# Patient Record
(demographics unavailable — no encounter records)

---

## 2025-05-29 NOTE — PHYSICAL EXAM
[Alert] : alert [Acute Distress] : no acute distress [Normocephalic] : normocephalic [Flat Open Anterior [place]] : flat open anterior fontanelle [Icteric sclera] : nonicteric sclera [PERRL] : PERRL [Red Reflex Bilateral] : red reflex bilateral [Normally Placed Ears] : normally placed ears [Auricles Well Formed] : auricles well formed [Clear Tympanic membranes] : clear tympanic membranes [Light reflex present] : light reflex present [Bony structures visible] : bony structures visible [Patent Auditory Canal] : patent auditory canal [Discharge] : no discharge [Nares Patent] : nares patent [Palate Intact] : palate intact [Uvula Midline] : uvula midline [Supple, full passive range of motion] : supple, full passive range of motion [Palpable Masses] : no palpable masses [Symmetric Chest Rise] : symmetric chest rise [Clear to Auscultation Bilaterally] : clear to auscultation bilaterally [Regular Rate and Rhythm] : regular rate and rhythm [S1, S2 present] : S1, S2 present [Murmurs] : no murmurs [+2 Femoral Pulses] : +2 femoral pulses [Soft] : soft [Tender] : nontender [Distended] : not distended [Bowel Sounds] : bowel sounds present [Umbilical Stump Dry, Clean, Intact] : umbilical stump dry, clean, intact [Hepatomegaly] : no hepatomegaly [Splenomegaly] : no splenomegaly [Normal external genitailia] : normal external genitalia [Central Urethral Opening] : central urethral opening [Testicles Descended Bilaterally] : testicles descended bilaterally [Patent] : patent [Normally Placed] : normally placed [No Abnormal Lymph Nodes Palpated] : no abnormal lymph nodes palpated [Chairez-Ortolani] : negative Chairez-Ortolani [Symmetric Flexed Extremities] : symmetric flexed extremities [Spinal Dimple] : no spinal dimple [Tuft of Hair] : no tuft of hair [Startle Reflex] : startle reflex present [Suck Reflex] : suck reflex present [Rooting] : rooting reflex present [Palmar Grasp] : palmar grasp present [Plantar Grasp] : plantar reflex present [Symmetric Shashi] : symmetric [place] [Jaundice] : not jaundice

## 2025-05-29 NOTE — HISTORY OF PRESENT ILLNESS
[FreeTextEntry1] : 37+4 SCARLET Reviewed prenatal problems requiring potential follow up for feedings, jaundice, kidney/brain/heart/other sonograms, and risk factors for hip dysplasia.   Hospital course and records available reviewed Formula and breast  DC 6-8 BW 6-12  Today 6-12  G6PD reported as normal  Bili 10.9  Bili today 9.2 BabyO+ C-

## 2025-06-09 NOTE — DISCUSSION/SUMMARY
[FreeTextEntry1] : 18 do here with candidal diaper dermatitis.  Patient found to have a candidal diaper dermatitis.  Clotrimazole was prescribed and parent/guardian was informed to apply topically with each diaper change.  Use of a white or zinc based diaper cream over the medication provides a good barrier.  Change soiled diapers as soon as possible.  Use water wipes to clean diaper area.  Return for failure to improve or worsening.

## 2025-06-09 NOTE — PHYSICAL EXAM
[No Acute Distress] : no acute distress [NL] : nonerythematous oropharynx [de-identified] : erythematous alok anal area with satellite lesions

## 2025-06-09 NOTE — HISTORY OF PRESENT ILLNESS
[de-identified] : diaper rash [FreeTextEntry6] : NURY SOARES is a 18 do here with complaints of diaper rash. He was rx'd Nystatin on 5/30 which parents state that they saw improvement with initially, however then skin appeared to be more erythematous, and baby appeared to be having pain with diaper changes.  Using water wipes.

## 2025-06-27 NOTE — HISTORY OF PRESENT ILLNESS
[Mother] : mother [Normal] : Normal [In Bassinet/Crib] : sleeps in bassinet/crib [No] : No cigarette smoke exposure [Water heater temperature set at <120 degrees F] : Water heater temperature set at <120 degrees F [Rear facing car seat in back seat] : Rear facing car seat in back seat [Carbon Monoxide Detectors] : Carbon monoxide detectors at home [Smoke Detectors] : Smoke detectors at home. [NO] : No [At risk for exposure to TB] : Not at risk for exposure to Tuberculosis  [de-identified] : 1.5 weeks of fussiness, difficulty feeding  [de-identified] : regular Similac 3 ounces

## 2025-06-27 NOTE — PHYSICAL EXAM
[Alert] : alert [Acute Distress] : no acute distress [Normocephalic] : normocephalic [Flat Open Anterior [place]] : flat open anterior fontanelle [PERRL] : PERRL [Red Reflex Bilateral] : red reflex bilateral [Normally Placed Ears] : normally placed ears [Auricles Well Formed] : auricles well formed [Clear Tympanic membranes] : clear tympanic membranes [Light reflex present] : light reflex present [Bony landmarks visible] : bony landmarks visible [Discharge] : no discharge [Nares Patent] : nares patent [Palate Intact] : palate intact [Uvula Midline] : uvula midline [Supple, full passive range of motion] : supple, full passive range of motion [Palpable Masses] : no palpable masses [Symmetric Chest Rise] : symmetric chest rise [Clear to Auscultation Bilaterally] : clear to auscultation bilaterally [Regular Rate and Rhythm] : regular rate and rhythm [S1, S2 present] : S1, S2 present [Murmurs] : no murmurs [+2 Femoral Pulses] : +2 femoral pulses [Soft] : soft [Tender] : nontender [Distended] : not distended [Bowel Sounds] : bowel sounds present [Hepatomegaly] : no hepatomegaly [Splenomegaly] : no splenomegaly [Normal external genitailia] : normal external genitalia [Central Urethral Opening] : central urethral opening [Testicles Descended Bilaterally] : testicles descended bilaterally [Normally Placed] : normally placed [No Abnormal Lymph Nodes Palpated] : no abnormal lymph nodes palpated [Chairez-Ortolani] : negative Chairez-Ortolani [Symmetric Flexed Extremities] : symmetric flexed extremities [Spinal Dimple] : no spinal dimple [Tuft of Hair] : no tuft of hair [Startle Reflex] : startle reflex present [Suck Reflex] : suck reflex present [Rooting] : rooting reflex present [Palmar Grasp] : palmar grasp reflex present [Plantar Grasp] : plantar grasp reflex present [Symmetric Shashi] : symmetric [place] [Jaundice] : no jaundice [Rash and/or lesion present] : no rash/lesion [de-identified] : alok anal irritation

## 2025-06-27 NOTE — HISTORY OF PRESENT ILLNESS
[Mother] : mother [Normal] : Normal [In Bassinet/Crib] : sleeps in bassinet/crib [No] : No cigarette smoke exposure [Water heater temperature set at <120 degrees F] : Water heater temperature set at <120 degrees F [Rear facing car seat in back seat] : Rear facing car seat in back seat [Carbon Monoxide Detectors] : Carbon monoxide detectors at home [Smoke Detectors] : Smoke detectors at home. [NO] : No [At risk for exposure to TB] : Not at risk for exposure to Tuberculosis  [de-identified] : 1.5 weeks of fussiness, difficulty feeding  [de-identified] : regular Similac 3 ounces

## 2025-06-27 NOTE — DISCUSSION/SUMMARY
[Normal Growth] : growth [Normal Development] : development  [No Elimination Concerns] : elimination [Normal Sleep Pattern] : sleep [None] : no medical problems [Anticipatory Guidance Given] : Anticipatory guidance addressed as per the history of present illness section [Parental Well-Being] : parental well-being [Family Adjustment] : family adjustment [Feeding Routines] : feeding routines [Infant Adjustment] : infant adjustment [Safety] : safety [No Medications] : ~He/She~ is not on any medications [Parent/Guardian] : Parent/Guardian [Mother] : mother [Parental Concerns Addressed] : Parental concerns addressed [FreeTextEntry1] : 1 month old here for well care with reflux and difficulty feeding.  Gaining weight well, reflux precautions recommended.   Gassiness and fussiness; stool neg for blood no mucus.   will try Sim sensitive - given sample, discussed Pepcid if not improving, can also repeat stool testing if indicated.  Discussed MPA, reflux and colic as well as supportive care.  If blood or mucus is seen in the stool, mom is aware to return for follow up.   Recommend exclusive breastfeeding, 8-12 feedings per day.  Continue Vitamin D drops and mother should continue prenatal vitamins and avoid alcohol. If formula is needed, recommend iron-fortified formulations, 2-4 oz every 2-3 hrs. Baby should have 5-8 wet diapers and 3-4 stools each day. When in car, patient should be in rear-facing car seat in back seat. Put baby to sleep on back, in own crib with no loose or soft bedding.  No pillows, blankets or bumpers in the sleep area.  Help baby to develop good sleep and feeding routines.  Black out curtains and a sound machine offer a soothing sleep environment.  Start tummy time when awake prior to each feeding. Limit baby's exposure to others, especially those with fever or unknown vaccine status. Avoid screen time. No co-sleeping. Parents counseled to call if rectal temperature >100.4 degrees F.

## 2025-06-27 NOTE — PHYSICAL EXAM
[Alert] : alert [Acute Distress] : no acute distress [Normocephalic] : normocephalic [Flat Open Anterior [place]] : flat open anterior fontanelle [PERRL] : PERRL [Red Reflex Bilateral] : red reflex bilateral [Normally Placed Ears] : normally placed ears [Auricles Well Formed] : auricles well formed [Clear Tympanic membranes] : clear tympanic membranes [Light reflex present] : light reflex present [Bony landmarks visible] : bony landmarks visible [Discharge] : no discharge [Nares Patent] : nares patent [Palate Intact] : palate intact [Uvula Midline] : uvula midline [Supple, full passive range of motion] : supple, full passive range of motion [Palpable Masses] : no palpable masses [Symmetric Chest Rise] : symmetric chest rise [Clear to Auscultation Bilaterally] : clear to auscultation bilaterally [Regular Rate and Rhythm] : regular rate and rhythm [S1, S2 present] : S1, S2 present [Murmurs] : no murmurs [+2 Femoral Pulses] : +2 femoral pulses [Soft] : soft [Tender] : nontender [Distended] : not distended [Bowel Sounds] : bowel sounds present [Hepatomegaly] : no hepatomegaly [Splenomegaly] : no splenomegaly [Normal external genitailia] : normal external genitalia [Central Urethral Opening] : central urethral opening [Testicles Descended Bilaterally] : testicles descended bilaterally [Normally Placed] : normally placed [No Abnormal Lymph Nodes Palpated] : no abnormal lymph nodes palpated [Chairez-Ortolani] : negative Chairez-Ortolani [Symmetric Flexed Extremities] : symmetric flexed extremities [Spinal Dimple] : no spinal dimple [Tuft of Hair] : no tuft of hair [Startle Reflex] : startle reflex present [Suck Reflex] : suck reflex present [Rooting] : rooting reflex present [Palmar Grasp] : palmar grasp reflex present [Plantar Grasp] : plantar grasp reflex present [Symmetric Shashi] : symmetric [place] [Jaundice] : no jaundice [Rash and/or lesion present] : no rash/lesion [de-identified] : alok anal irritation

## 2025-07-26 NOTE — PHYSICAL EXAM
[No Acute Distress] : no acute distress [NL] : soft, nontender, nondistended, normal bowel sounds, no hepatosplenomegaly [No Abnormal Lymph Nodes Palpated] : no abnormal lymph nodes palpated [Warm] : warm [de-identified] : evidence of miliaria to the neck folds, resolving

## 2025-07-26 NOTE — HISTORY OF PRESENT ILLNESS
[de-identified] : Rash [FreeTextEntry6] : NURY SOARES is a 2 month old male presenting for rash under his neck.  Noticed this morning after waking up.  Mom used some Aquaphor and it cleared.  He is eating well, sleeping well. No other concerns.

## 2025-07-26 NOTE — HISTORY OF PRESENT ILLNESS
[de-identified] : Rash [FreeTextEntry6] : NURY SOARES is a 2 month old male presenting for rash under his neck.  Noticed this morning after waking up.  Mom used some Aquaphor and it cleared.  He is eating well, sleeping well. No other concerns.

## 2025-07-26 NOTE — PHYSICAL EXAM
[No Acute Distress] : no acute distress [NL] : soft, nontender, nondistended, normal bowel sounds, no hepatosplenomegaly [No Abnormal Lymph Nodes Palpated] : no abnormal lymph nodes palpated [Warm] : warm [de-identified] : evidence of miliaria to the neck folds, resolving

## 2025-07-26 NOTE — DISCUSSION/SUMMARY
MONTHLY PROGRESS NOTE AND PARTICIPATION REPORT   [facility]    Shanna Shanks, 1974  Attendance: Please see Epic for attendance record.    Communication:   Does communicate   Hearing:   No impairment  Vision:   No impairment  Orientation/Cognition:   Minor forgetfulness  Partial disorientation  Short term memory loss  Behavior:   Requires redirection  Self-Preservation Skills:   Not capable of self-preservation  Eating:   Independent  Assist with set up  Ambulation Walking:   SBA with ambulation  Transferring:   Independent  Wheelchair:   N/A  Toileting:   Needs assistance  Maintenance Program:     [facility]  Living Arrangements:   Lives with family  Spiritual Needs: Needs are being met through support group  Medication Assistance:   Medication not taken during program hours  Participation Report:   Aerobics/Exercise  Support Group  Cognitive Stimulation  Creative Arts/Crafts  Games  Speakers/Entertainment  Socialization  Current Events/News  Education/Health Topic  Meditation with Melina, adaptive yoga, music meditation (cello), guided meditation, town rojas meetings, prayer time with Melina, communion with Melina, yoga relaxation with Nyla, Olympics education, assistive technology presentation, spelling bee week  Level of Participation:   Active  To the best of their ability      
[facility] Note:  Member has been having incontinent episodes that past couple of weeks when she has been here and that has resulted in change of clothing.  Spoke with  and he is fine with sending her in depends when she is here.  We also talked about just informing her that we will use the bathroom instead of asking.  When we are asking her, she tends to decline the offer to go.   is in agreement.    
[FreeTextEntry1] : 2 mo here with heat rash, resolving.   Discussed natural hx of heat rash.  Can continue to use barrier ointment PRN.  Follow up PRN worsening symptoms, persistent fever of 100.4 or more or failure to improve. 
[FreeTextEntry1] : 2 mo here with heat rash, resolving.   Discussed natural hx of heat rash.  Can continue to use barrier ointment PRN.  Follow up PRN worsening symptoms, persistent fever of 100.4 or more or failure to improve.

## 2025-07-29 NOTE — PHYSICAL EXAM
[Alert] : alert [Acute Distress] : no acute distress [Normocephalic] : normocephalic [Flat Open Anterior [place]] : flat open anterior fontanelle [PERRL] : PERRL [Red Reflex Bilateral] : red reflex bilateral [Normally Placed Ears] : normally placed ears [Auricles Well Formed] : auricles well formed [Clear Tympanic membranes] : clear tympanic membranes [Light reflex present] : light reflex present [Bony landmarks visible] : bony landmarks visible [Discharge] : no discharge [Nares Patent] : nares patent [Palate Intact] : palate intact [Uvula Midline] : uvula midline [Supple, full passive range of motion] : supple, full passive range of motion [Palpable Masses] : no palpable masses [Symmetric Chest Rise] : symmetric chest rise [Clear to Auscultation Bilaterally] : clear to auscultation bilaterally [Regular Rate and Rhythm] : regular rate and rhythm [S1, S2 present] : S1, S2 present [Murmurs] : no murmurs [+2 Femoral Pulses] : +2 femoral pulses [Soft] : soft [Tender] : nontender [Distended] : not distended [Bowel Sounds] : bowel sounds present [Hepatomegaly] : no hepatomegaly [Splenomegaly] : no splenomegaly [Normal external genitailia] : normal external genitalia [Central Urethral Opening] : central urethral opening [Testicles Descended Bilaterally] : testicles descended bilaterally [Normally Placed] : normally placed [No Abnormal Lymph Nodes Palpated] : no abnormal lymph nodes palpated [Chairez-Ortolani] : negative Chairez-Ortolani [Symmetric Flexed Extremities] : symmetric flexed extremities [Spinal Dimple] : no spinal dimple [Tuft of Hair] : no tuft of hair [Startle Reflex] : startle reflex present [Suck Reflex] : suck reflex present [Rooting] : rooting reflex present [Palmar Grasp] : palmar grasp reflex present [Plantar Grasp] : plantar grasp reflex present [Symmetric Shashi] : symmetric [place] [de-identified] : eczema folds

## 2025-07-29 NOTE — DISCUSSION/SUMMARY
[Normal Growth] : growth [Normal Development] : development  [No Elimination Concerns] : elimination [Continue Regimen] : feeding [No Skin Concerns] : skin [Normal Sleep Pattern] : sleep [None] : no medical problems [Anticipatory Guidance Given] : Anticipatory guidance addressed as per the history of present illness section [Age Approp Vaccines] : Age appropriate vaccines administered [No Medications] : ~He/She~ is not on any medications [Parent/Guardian] : Parent/Guardian [] : The components of the vaccine(s) to be administered today are listed in the plan of care. The disease(s) for which the vaccine(s) are intended to prevent and the risks have been discussed with the caretaker.  The risks are also included in the appropriate vaccination information statements which have been provided to the patient's caregiver.  The caregiver has given consent to vaccinate. [FreeTextEntry1] : [place] review and referral as appropriate.   Vitamin D for breast feeding infants  Tummy time when awake.  Put baby to sleep on back, in own crib with no loose or soft bedding. Help baby to develop sleep and feeding routines.  If formula is needed, recommend iron-fortified formulations, 2-4 oz every 2-3 hrs.  When in car, patient should be in rear-facing car seat in back seat.  Pacifier benefits reviewed  [email or web address] reviewed